# Patient Record
Sex: FEMALE | Race: WHITE | Employment: UNEMPLOYED | URBAN - METROPOLITAN AREA
[De-identification: names, ages, dates, MRNs, and addresses within clinical notes are randomized per-mention and may not be internally consistent; named-entity substitution may affect disease eponyms.]

---

## 2022-03-29 ENCOUNTER — HOSPITAL ENCOUNTER (EMERGENCY)
Age: 77
Discharge: HOME | End: 2022-03-29
Attending: STUDENT IN AN ORGANIZED HEALTH CARE EDUCATION/TRAINING PROGRAM
Payer: MEDICARE

## 2022-03-29 VITALS
OXYGEN SATURATION: 97 % | SYSTOLIC BLOOD PRESSURE: 142 MMHG | DIASTOLIC BLOOD PRESSURE: 96 MMHG | TEMPERATURE: 97.3 F | RESPIRATION RATE: 18 BRPM | HEART RATE: 66 BPM

## 2022-03-29 DIAGNOSIS — E11.649 HYPOGLYCEMIC EPISODE IN PATIENT WITH DIABETES MELLITUS (HCC): Primary | ICD-10-CM

## 2022-03-29 LAB
GLUCOSE BLD STRIP.AUTO-MCNC: 82 MG/DL (ref 70–110)
GLUCOSE BLD STRIP.AUTO-MCNC: 94 MG/DL (ref 70–110)

## 2022-03-29 PROCEDURE — 99283 EMERGENCY DEPT VISIT LOW MDM: CPT

## 2022-03-29 PROCEDURE — 99282 EMERGENCY DEPT VISIT SF MDM: CPT

## 2022-03-29 PROCEDURE — 82962 GLUCOSE BLOOD TEST: CPT

## 2022-03-29 NOTE — ED PROVIDER NOTES
EMERGENCY DEPARTMENT HISTORY AND PHYSICAL EXAM    Date: 3/29/2022  Patient Name: García Forman    History of Presenting Illness     Chief Complaint   Patient presents with    Low Blood Sugar         History Provided By: Patient and daughter     7:19 PM  García Forman is a 64 y.o. female with PMHX of asthma, diabetes who presents to the emergency department C/O episode of hypoglycemia. Patient was brought in by daughter. Daughter states they live together and at home the patient kept asking her for a piece of paper seemed confused and sleepy so she called 911. Upon EMS arrival, patient's blood sugar was 28. They established an IV gave dextrose with immediate improvement in patient's mental status and return to normal.  They had her eat a peanut butter sandwich and patient ultimately declined transport to the hospital.  Daughter decided to bring her here to be checked anyway as this is never happened before. Patient states she is a type II diabetic on Glipizide/Metformin 5-500mg. She has been eating a much stricter diet, converted to vegetarian and has lost 60 pounds over the last few months. Her last A1c was 5.64 months ago. Patient otherwise feels fine, daughter states she is acting her normal self and they have dinner ordered to  a [employer] at 8 PM tonight. Pt denies fever, recent illness, insulin use, any complaints of pain, and any other sxs or complaints. PCP: Dalia Severino DO        Past History     Past Medical History:  Past Medical History:   Diagnosis Date    Asthma     Diabetes [facility])        Past Surgical History:  History reviewed. No pertinent surgical history. Family History:  History reviewed. No pertinent family history. Social History:  Social History     Tobacco Use    Smoking status: Never Smoker    Smokeless tobacco: Not on file   Substance Use Topics    Alcohol use: Yes     Comment: social    Drug use: Never       Allergies:   Allergies   Allergen Reactions    Pcn [Penicillins] Other (comments)    Statins-Hmg-Coa Reductase Inhibitors Unknown (comments)         Review of Systems   Review of Systems   Constitutional: Negative for fever. Cardiovascular: Negative for chest pain. Gastrointestinal: Negative for abdominal pain and vomiting. Musculoskeletal: Negative for arthralgias. All other systems reviewed and are negative. Physical Exam     Vitals:    03/29/22 1954   BP: (!) 142/96   Pulse: 66   Resp: 18   Temp: 97.3 °F (36.3 °C)   SpO2: 97%     Physical Exam  Vital signs and nursing notes reviewed. CONSTITUTIONAL: Alert. Well-appearing; overweight; in no apparent distress. HEAD: Normocephalic; atraumatic. EYES: PERRL; Conjunctiva clear. CV: Normal S1, S2; no murmurs, rubs, or gallops. No chest wall tenderness. RESPIRATORY: Normal chest excursion with respiration; breath sounds clear and equal bilaterally; no wheezes, rhonchi, or rales. SKIN: Normal for age and race; warm; dry; good turgor; no apparent lesions or exudate. NEURO: A & O x3. PSYCH:  Mood and affect appropriate. Diagnostic Study Results     Labs -     Recent Results (from the past 12 hour(s))   GLUCOSE, POC    Collection Time: 03/29/22  6:57 PM   Result Value Ref Range    Glucose (POC) 94 70 - 110 mg/dL   GLUCOSE, POC    Collection Time: 03/29/22  7:59 PM   Result Value Ref Range    Glucose (POC) 82 70 - 110 mg/dL       Radiologic Studies -   No orders to display     CT Results  (Last 48 hours)    None        CXR Results  (Last 48 hours)    None          Medications given in the ED-  Medications - No data to display      Medical Decision Making   I am the first provider for this patient. I reviewed the vital signs, available nursing notes, past medical history, past surgical history, family history and social history. Vital Signs-Reviewed the patient's vital signs.     Records Reviewed: Nursing Notes      Procedures:  Procedures    ED Course:  7:19 PM   Initial assessment performed. The patients presenting problems have been discussed, and they are in agreement with the care plan formulated and outlined with them. I have encouraged them to ask questions as they arise throughout their visit. Provider Notes (Medical Decision Making): Italia Lloyd is a 64 y.o. female brought in by daughter for hypoglycemic episode that resolved with dextrose given by EMS prior to arrival.  Patient feeling fine, daughter states immediately return to normal.  Upon arrival to ED her blood sugar is 98. In fact she declines any further work-up and wants to go  her food at 8 PM.  Daughter is comfortable with this. They have a new glucometer at home and will check blood sugars closely at least twice a day. I have advised to hold her glipizide/Metformin dose until they follow-up with her PCP as the sulfonylurea, with Metformin cause increased risk of hypoglycemia, especially since her last A1c 4 months ago was 5 and she has lost 60 pounds with diet changes. Also advised to keep something sugary on her in case of any symptoms of hypoglycemia current    Diagnosis and Disposition       DISCHARGE NOTE:    Kervin Medina's  results have been reviewed with her. She has been counseled regarding her diagnosis, treatment, and plan. She verbally conveys understanding and agreement of the signs, symptoms, diagnosis, treatment and prognosis and additionally agrees to follow up as discussed. She also agrees with the care-plan and conveys that all of her questions have been answered. I have also provided discharge instructions for her that include: educational information regarding their diagnosis and treatment, and list of reasons why they would want to return to the ED prior to their follow-up appointment, should her condition change. She has been provided with education for proper emergency department utilization. CLINICAL IMPRESSION:    1.  Hypoglycemic episode in patient with [facility]        PLAN:  1. D/C Home  2. There are no discharge medications for this patient. 3.   Follow-up Information     Follow up With Specialties Details Why Contact Info    Your PCP  Schedule an appointment as soon as possible for a visit       [facility] EMERGENCY DEPT Emergency Medicine  As needed, If symptoms worsen [address] [place] [address]  [phone]        _______________________________      Please note that this dictation was completed with [employer], the computer voice recognition software. Quite often unanticipated grammatical, syntax, homophones, and other interpretive errors are inadvertently transcribed by the computer software. Please disregard these errors. Please excuse any errors that have escaped final proofreading.

## 2022-03-29 NOTE — ED TRIAGE NOTES
Patient daughter reports she was acting abnormal she called 911 and they checked her glucose and it 28 and they gave her 1/2 bag dextrose and she she was normal after that and glucose improved. So she did not want to come to ed.  Daughter felt she soul be seen so they drove her here patient is alert and oriented int triage

## 2022-03-29 NOTE — DISCHARGE INSTRUCTIONS
Do not take your Glipizide/Metformin until you follow up with your PCP. \    Please check your blood sugar twice daily.

## 2022-03-30 NOTE — ED NOTES
Gave patient ginger ale for the road. Going straight to  ordered food prior to going home. Daughter at bedside with patient    I have reviewed discharge instructions with the patient. The patient verbalized understanding.